# Patient Record
Sex: FEMALE | Race: WHITE | NOT HISPANIC OR LATINO | Employment: UNEMPLOYED | URBAN - METROPOLITAN AREA
[De-identification: names, ages, dates, MRNs, and addresses within clinical notes are randomized per-mention and may not be internally consistent; named-entity substitution may affect disease eponyms.]

---

## 2019-09-23 ENCOUNTER — APPOINTMENT (OUTPATIENT)
Dept: RADIOLOGY | Facility: MEDICAL CENTER | Age: 59
End: 2019-09-23
Attending: EMERGENCY MEDICINE
Payer: MEDICAID

## 2019-09-23 ENCOUNTER — HOSPITAL ENCOUNTER (EMERGENCY)
Facility: MEDICAL CENTER | Age: 59
End: 2019-09-23
Attending: EMERGENCY MEDICINE
Payer: MEDICAID

## 2019-09-23 VITALS
OXYGEN SATURATION: 100 % | WEIGHT: 195.99 LBS | DIASTOLIC BLOOD PRESSURE: 62 MMHG | RESPIRATION RATE: 16 BRPM | HEART RATE: 90 BPM | SYSTOLIC BLOOD PRESSURE: 102 MMHG | HEIGHT: 68 IN | TEMPERATURE: 98 F | BODY MASS INDEX: 29.7 KG/M2

## 2019-09-23 DIAGNOSIS — S92.354A NONDISPLACED FRACTURE OF FIFTH METATARSAL BONE, RIGHT FOOT, INITIAL ENCOUNTER FOR CLOSED FRACTURE: ICD-10-CM

## 2019-09-23 PROCEDURE — 99284 EMERGENCY DEPT VISIT MOD MDM: CPT

## 2019-09-23 PROCEDURE — 302874 HCHG BANDAGE ACE 2 OR 3""

## 2019-09-23 PROCEDURE — 29515 APPLICATION SHORT LEG SPLINT: CPT

## 2019-09-23 PROCEDURE — 73630 X-RAY EXAM OF FOOT: CPT | Mod: RT

## 2019-09-23 NOTE — ED TRIAGE NOTES
"Zuleika Carranzamindi 58 y.o. female to triage via w/c for     Chief Complaint   Patient presents with   • Foot Pain     pt reports she was walking her dog on Saturday night when she \"was on a phone call, turned around and went down\".  Pt has been trying to walk on it since but is having difficulty bearing weight due to pain.  Pt reports swelling to top of foot, lateral side.   • T-5000   • Foot Swelling     Pt is able to stand independently and transfer independently.   BP (!) 97/68   Pulse (!) 104   Temp 36.7 °C (98 °F) (Oral)   Resp 16   Ht 1.727 m (5' 8\")   Wt 88.9 kg (195 lb 15.8 oz)   LMP 01/01/2004   SpO2 100%   BMI 29.80 kg/m²   Pt returned to the lobby to await bed assignment.  Advised to return to the triage desk for any changes/concerns.  "

## 2019-09-23 NOTE — ED PROVIDER NOTES
"ED Provider Note      CHIEF COMPLAINT   Chief Complaint   Patient presents with   • Foot Pain     pt reports she was walking her dog on Saturday night when she \"was on a phone call, turned around and went down\".  Pt has been trying to walk on it since but is having difficulty bearing weight due to pain.  Pt reports swelling to top of foot, lateral side.   • T-5000   • Foot Swelling       HPI   Zuleika Evans is a 58 y.o. female who presents with right foot pain.  Walking her dog 3 days ago.  Turned around and twisted her right foot.  Throbbing nonradiating pain diffusely over the dorsum of the foot.  Denies ankle pain.  She has noted swelling.  The pain is worse with any movement and ambulation.  Does not seem to be getting better.  No numbness tingling weakness.  No laceration    REVIEW OF SYSTEMS   Pertinent negative: As above    PAST MEDICAL HISTORY   Past Medical History:   Diagnosis Date   • Acute MI    • Anxiety    • Arthritis    • Asthma    • Asthma 5/11/2010   • Bartholin's cyst     r vag wall   • CAD (coronary artery disease)    • Depression    • Disease, gall bladder     removed   • Indigestion    • Kidney stones    • Pneumonia        SOCIAL HISTORY  Social History     Tobacco Use   • Smoking status: Current Every Day Smoker     Types: Cigarettes   • Tobacco comment: 2 packs per week - down to 5 cigarettes/day   Substance Use Topics   • Alcohol use: Yes     Comment: rare   • Drug use: Yes     Comment: states she smokes marijuana occasionally       ALLERGIES   See chart    PHYSICAL EXAM  VITAL SIGNS: BP (!) 97/68   Pulse (!) 104   Temp 36.7 °C (98 °F) (Oral)   Resp 16   Ht 1.727 m (5' 8\")   Wt 88.9 kg (195 lb 15.8 oz)   LMP 01/01/2004   SpO2 100%   BMI 29.80 kg/m²   Head: Atraumatic  Eyes: Eyes normal inspection  Neck: has full range of motion, normal inspection.  Constitutional: No acute distress   Cardiovascular: Normal heart rate. Pulses strong dorsalis pedis  Thorax & Lungs: No " respiratory distress  Skin: Ecchymosis dorsally laterally over the right foot.  Musculoskeletal: Ecchymosis as above.  Mild swelling over the dorsal and lateral right foot.  No tenderness over the ankle or more proximal right lower extremity.  Compartments soft.  Neurologic:  Normal sensory and motor    RADIOLOGY/PROCEDURES  DX-FOOT-COMPLETE 3+ RIGHT   Final Result      Nondisplaced fracture of the fifth metatarsal base.         Imaging is interpreted by radiologist reviewed by me    COURSE & MEDICAL DECISION MAKING  Declined pain medication.    Patient presents with foot pain.  Has tenderness over the fifth metatarsal and diffusely over the dorsal right foot.  An x-ray was obtained demonstrating fracture at the base of the fifth metatarsal.  Placed in a posterior short leg splint on crutches.  Advised rest ice elevate and Tylenol.  Refer to Dr. Payne.  I asked her to call today to set up an appointment this week.  Return the ER if she notices other area of injury or has concern.    FINAL IMPRESSION  1.  Right fifth metatarsal fracture      This dictation was created using voice recognition software. The accuracy of the dictation is limited to the abilities of the software. I expect there may be some errors of grammar and possibly content. The nursing notes were reviewed and certain aspects of this information were incorporated into this note.      Electronically signed by: Kermit Glass, 9/23/2019 4:05 PM

## 2019-09-24 NOTE — ED NOTES
Pt splinted by tech.  Pt admits readiness for discharge.  Patient given discharge instructions and verbalized understanding.  Vital signs are stable.  Pt denies any further needs.

## 2022-02-01 ENCOUNTER — OFFICE VISIT (OUTPATIENT)
Dept: URGENT CARE | Facility: CLINIC | Age: 62
End: 2022-02-01
Payer: MEDICAID

## 2022-02-01 VITALS
HEIGHT: 67 IN | WEIGHT: 216.2 LBS | BODY MASS INDEX: 33.93 KG/M2 | SYSTOLIC BLOOD PRESSURE: 130 MMHG | RESPIRATION RATE: 16 BRPM | HEART RATE: 96 BPM | TEMPERATURE: 97.1 F | OXYGEN SATURATION: 96 % | DIASTOLIC BLOOD PRESSURE: 72 MMHG

## 2022-02-01 DIAGNOSIS — F17.200 TOBACCO DEPENDENCY: ICD-10-CM

## 2022-02-01 DIAGNOSIS — I25.10 CORONARY ARTERY DISEASE INVOLVING NATIVE HEART WITHOUT ANGINA PECTORIS, UNSPECIFIED VESSEL OR LESION TYPE: ICD-10-CM

## 2022-02-01 DIAGNOSIS — L03.211 FACIAL CELLULITIS: ICD-10-CM

## 2022-02-01 PROCEDURE — 99213 OFFICE O/P EST LOW 20 MIN: CPT | Performed by: EMERGENCY MEDICINE

## 2022-02-01 RX ORDER — BACITRACIN 500 [USP'U]/G
0.25 OINTMENT OPHTHALMIC EVERY 6 HOURS
Qty: 3.5 G | Refills: 0 | Status: SHIPPED | OUTPATIENT
Start: 2022-02-01 | End: 2022-02-06

## 2022-02-01 RX ORDER — CHOLECALCIFEROL (VITAMIN D3) 1250 MCG
CAPSULE ORAL
COMMUNITY

## 2022-02-01 ASSESSMENT — ENCOUNTER SYMPTOMS
BLURRED VISION: 0
FEVER: 0
CHILLS: 0
COUGH: 0
EYE DISCHARGE: 0
DOUBLE VISION: 0
EYE PAIN: 0

## 2022-02-01 ASSESSMENT — VISUAL ACUITY
OS_CC: 20/50
OD_CC: 20/40

## 2022-02-01 NOTE — PROGRESS NOTES
"Subjective     Zuleika Evans is a 61 y.o. female who presents with Eye Problem (swollen left eye, pussed clear fuilds with some blood, comes and goes, lump between eye and nose x 7months)            Pt reports left supraorbital cyst (shows pic with golfball-sized lesion just inferior to left medial eyebrow) which burst yesterday.  No systemic sx, no rhinorrhea.  States has had same once before which also ruptured spontaneously and was not eval.  No visual changes or impairment, no eye drainage, no nasal complaints, no swollen lymph nodes noted.       Review of Systems   Constitutional: Negative for chills and fever.   Eyes: Negative for blurred vision, double vision, pain and discharge.        Lesion over left eye   Respiratory: Negative for cough.    Cardiovascular: Negative for chest pain.   Skin:        Lesion over eye as noted   All other systems reviewed and are negative.             Objective     /72 (BP Location: Left arm, Patient Position: Sitting, BP Cuff Size: Adult)   Pulse 96   Temp 36.2 °C (97.1 °F) (Temporal)   Resp 16   Ht 1.702 m (5' 7\")   Wt 98.1 kg (216 lb 3.2 oz)   LMP 01/01/2004   SpO2 96%   BMI 33.86 kg/m²      Physical Exam  Vitals and nursing note reviewed.   Constitutional:       General: She is not in acute distress.     Appearance: Normal appearance. She is not toxic-appearing.   HENT:      Head: Normocephalic and atraumatic.      Nose: Nose normal.      Mouth/Throat:      Mouth: Mucous membranes are moist.      Pharynx: No oropharyngeal exudate or posterior oropharyngeal erythema.   Eyes:      General: No scleral icterus.     Extraocular Movements: Extraocular movements intact.      Conjunctiva/sclera: Conjunctivae normal.      Right eye: Right conjunctiva is not injected. No exudate or hemorrhage.     Left eye: Left conjunctiva is not injected. No exudate or hemorrhage.     Pupils: Pupils are equal, round, and reactive to light.     Cardiovascular:      Rate and " Rhythm: Normal rate and regular rhythm.   Pulmonary:      Effort: Pulmonary effort is normal. No respiratory distress.      Breath sounds: Normal breath sounds. No wheezing.   Musculoskeletal:      Cervical back: Normal range of motion and neck supple.   Lymphadenopathy:      Cervical: No cervical adenopathy.   Skin:     General: Skin is warm and dry.      Comments: 4mm reddened area on medial left brow where one lesion burst, no streaking or undue tenderness.  Small area on left upper eyelid where another lesion apparently drained - no erythema.   Neurological:      Mental Status: She is alert and oriented to person, place, and time.   Psychiatric:         Mood and Affect: Mood normal.         Behavior: Behavior normal.         Thought Content: Thought content normal.                             Assessment & Plan   Encounter Diagnoses   Name Primary?   • Facial cellulitis    • Coronary artery disease involving native heart without angina pectoris, unspecified vessel or lesion type    • Tobacco dependency           1. Facial cellulitis  4mm Limited area of cellulitis medial brow, feel topical management will suffice, no systemic abx needed.  Given this is second episode, feel additional eval warranted in followup.   - Referral to Dermatology  - bacitracin 500 UNIT/GM Ointment; Apply 0.25 Inches to left eye every 6 hours for 5 days. Apply to red area above left eye as prescribed for 4 days, then as needed.  Dispense: 3.5 g; Refill: 0    2. Coronary artery disease involving native heart without angina pectoris, unspecified vessel or lesion type  No active complaints  - Referral to establish with [facility] PCP    3. Tobacco dependency  Counseled pt on importance of tobacco cessation for her general health.     Pt agreeable w plans as above, provided discharge instructions and verbal instructions on wound care.

## 2022-02-01 NOTE — PATIENT INSTRUCTIONS
If you are unable to get in with dermatology locally, consider trying nearby cities such as [facility], [place] as well if you are able to travel.   Apply bacitracin ointment to reddened area above left eye twice daily.  Return if redness spreads, pain in eye, worse.

## 2022-02-18 PROCEDURE — RXMED WILLOW AMBULATORY MEDICATION CHARGE: Performed by: EMERGENCY MEDICINE

## 2022-02-24 ENCOUNTER — PHARMACY VISIT (OUTPATIENT)
Dept: PHARMACY | Facility: MEDICAL CENTER | Age: 62
End: 2022-02-24
Payer: MEDICARE

## 2022-02-24 PROCEDURE — RXMED WILLOW AMBULATORY MEDICATION CHARGE: Performed by: HOSPITALIST

## 2022-02-24 RX ORDER — BISACODYL 10 MG
SUPPOSITORY, RECTAL RECTAL
Qty: 10 SUPPOSITORY | Refills: 0 | OUTPATIENT
Start: 2022-02-24

## 2022-02-24 RX ORDER — AMOXICILLIN 250 MG
1 CAPSULE ORAL 2 TIMES DAILY
Qty: 60 TABLET | Refills: 0 | OUTPATIENT
Start: 2022-02-24

## 2022-02-24 RX ORDER — IPRATROPIUM BROMIDE AND ALBUTEROL 20; 100 UG/1; UG/1
SPRAY, METERED RESPIRATORY (INHALATION)
Qty: 4 G | Refills: 0 | OUTPATIENT
Start: 2022-02-24

## 2022-02-24 RX ORDER — ATORVASTATIN CALCIUM 10 MG/1
TABLET, FILM COATED ORAL
Qty: 30 TABLET | Refills: 0 | OUTPATIENT
Start: 2022-02-24

## 2022-02-24 RX ORDER — ASPIRIN 81 MG/1
TABLET, CHEWABLE ORAL
Qty: 60 TABLET | Refills: 0 | OUTPATIENT
Start: 2022-02-24

## 2022-02-25 ENCOUNTER — PHARMACY VISIT (OUTPATIENT)
Dept: PHARMACY | Facility: MEDICAL CENTER | Age: 62
End: 2022-02-25
Payer: MEDICARE

## 2022-03-09 PROCEDURE — RXMED WILLOW AMBULATORY MEDICATION CHARGE: Performed by: ORTHOPAEDIC SURGERY

## 2022-03-10 ENCOUNTER — PHARMACY VISIT (OUTPATIENT)
Dept: PHARMACY | Facility: MEDICAL CENTER | Age: 62
End: 2022-03-10
Payer: MEDICARE

## 2023-11-17 ENCOUNTER — OFFICE VISIT (OUTPATIENT)
Dept: URGENT CARE | Facility: CLINIC | Age: 63
End: 2023-11-17
Payer: MEDICAID

## 2023-11-17 VITALS
BODY MASS INDEX: 31.07 KG/M2 | TEMPERATURE: 96.6 F | OXYGEN SATURATION: 98 % | DIASTOLIC BLOOD PRESSURE: 68 MMHG | WEIGHT: 205 LBS | HEIGHT: 68 IN | RESPIRATION RATE: 14 BRPM | SYSTOLIC BLOOD PRESSURE: 108 MMHG | HEART RATE: 72 BPM

## 2023-11-17 DIAGNOSIS — J06.9 UPPER RESPIRATORY TRACT INFECTION, UNSPECIFIED TYPE: ICD-10-CM

## 2023-11-17 PROCEDURE — 3074F SYST BP LT 130 MM HG: CPT | Performed by: PHYSICIAN ASSISTANT

## 2023-11-17 PROCEDURE — 3078F DIAST BP <80 MM HG: CPT | Performed by: PHYSICIAN ASSISTANT

## 2023-11-17 PROCEDURE — 99213 OFFICE O/P EST LOW 20 MIN: CPT | Performed by: PHYSICIAN ASSISTANT

## 2023-11-17 RX ORDER — DEXTROMETHORPHAN HYDROBROMIDE AND PROMETHAZINE HYDROCHLORIDE 15; 6.25 MG/5ML; MG/5ML
5 SYRUP ORAL EVERY 4 HOURS PRN
Qty: 120 ML | Refills: 0 | Status: SHIPPED | OUTPATIENT
Start: 2023-11-17

## 2023-11-17 RX ORDER — LIDOCAINE HYDROCHLORIDE 20 MG/ML
5 SOLUTION OROPHARYNGEAL
Qty: 100 ML | Refills: 0 | Status: SHIPPED | OUTPATIENT
Start: 2023-11-17 | End: 2023-11-22

## 2023-11-17 ASSESSMENT — ENCOUNTER SYMPTOMS
HEADACHES: 0
SPUTUM PRODUCTION: 0
NAUSEA: 0
DIAPHORESIS: 0
ABDOMINAL PAIN: 0
SHORTNESS OF BREATH: 0
SINUS PAIN: 0
FEVER: 0
DIZZINESS: 0
VOMITING: 0
SORE THROAT: 1
MYALGIAS: 0
PALPITATIONS: 0
WHEEZING: 0
COUGH: 1
DIARRHEA: 1
CHILLS: 0

## 2023-11-17 ASSESSMENT — FIBROSIS 4 INDEX: FIB4 SCORE: 0.85

## 2023-11-18 NOTE — PROGRESS NOTES
Subjective:     CHIEF COMPLAINT  Chief Complaint   Patient presents with    Pharyngitis     X 5 days, sore throat, diarrhea, cough, runny nose       HPI  Zuleika Evans is a very pleasant 62 y.o. female who presents to the clinic with URI-like symptoms x5 days.  Patient has been experiencing sore throat, cough, congestion and diarrhea.  Main complaint at this time is pain with swallowing.  No difficulty swallowing or handling secretions.  Does not believe she has been running a fever.  No neck pain or stiffness.  Cough is dry nonproductive.  No shortness of breath or chest pain.  No known ill contacts.  She has been using Chloraseptic spray which provides moderate improvement.  Started taking DayQuil today.    REVIEW OF SYSTEMS  Review of Systems   Constitutional:  Positive for malaise/fatigue. Negative for chills, diaphoresis and fever.   HENT:  Positive for congestion and sore throat. Negative for ear pain and sinus pain.    Respiratory:  Positive for cough. Negative for sputum production, shortness of breath and wheezing.    Cardiovascular:  Negative for chest pain and palpitations.   Gastrointestinal:  Positive for diarrhea. Negative for abdominal pain, nausea and vomiting.   Musculoskeletal:  Negative for myalgias.   Neurological:  Negative for dizziness and headaches.       PAST MEDICAL HISTORY  Patient Active Problem List    Diagnosis Date Noted    Ankle pain, right 06/11/2012    Neck pain 04/18/2012    Radicular pain in left arm 04/18/2012    Right arm numbness 04/18/2012    Dyslipidemia 02/08/2012    Blood in stool 02/08/2012    Pelvic pain in female 11/22/2011    Vitamin d deficiency 10/18/2011    Bartholin cyst 09/14/2011    GERD (gastroesophageal reflux disease) 06/16/2011    Exposure to hepatitis 03/02/2011    Headache 08/24/2010    Syncope 08/24/2010    Bruising 08/24/2010    CRI (chronic renal insufficiency) 06/08/2010    Facial injury 06/08/2010    CAD (coronary artery disease) 05/11/2010     ASTHMA 05/11/2010    Arthritis 05/11/2010    Anemia 05/11/2010    Kidney calculi 05/11/2010       SURGICAL HISTORY   has a past surgical history that includes cholecystectomy.    ALLERGIES  Allergies   Allergen Reactions    Other Food      EGGS    Other Misc      BEES    Penicillins Shortness of Breath     Pt states that penicillins make her throat close up    Prilosec [Omeprazole] Vomiting    Sulfa Drugs Shortness of Breath and Swelling       CURRENT MEDICATIONS  Home Medications       Reviewed by Rene Amaro P.A.-C. (Physician Assistant) on 11/17/23 at 1659  Med List Status: <None>     Medication Last Dose Status   Albuterol (PROVENTIL INH) Not Taking Active   ASPIRIN Not Taking Active   aspirin (ASA) 81 MG Chew Tab chewable tablet Not Taking Active   atorvastatin (LIPITOR) 10 MG Tab Not Taking Active   bacitracin 500 UNIT/GM Ointment Not Taking Active   bisacodyl (DULCOLAX) 10 MG Suppos Not Taking Active   Cholecalciferol (VITAMIN D3) 1.25 MG (15523 UT) Cap Not Taking Active   clindamycin (CLEOCIN) 150 MG CAPS Not Taking Active   clonazepam (KLONOPIN) 0.5 MG TABS Not Taking Active   ferrous sulfate 325 (65 FE) MG tablet Not Taking Active   ipratropium-albuterol (COMBIVENT RESPIMAT)  MCG/ACT Aero Soln Not Taking Active   mirtazapine (REMERON) 15 MG TABS Not Taking Active   neomycin-polymyxin-HC (CORTISPORIN) 3.5-73929-9 SUSP Not Taking Active   nitroglycerin (NITROSTAT) 0.4 MG SUBL Not Taking Active   oxycodone, immediate release, (ROXICODONE) 5 MG TABS Not Taking Active   oxycodone-acetaminophen (PERCOCET) 5-325 MG TABS Not Taking Active   senna-docusate (PERICOLACE OR SENOKOT S) 8.6-50 MG Tab Not Taking Active   vitamin D, Ergocalciferol, (DRISDOL) 68654 UNIT CAPS capsule Not Taking Active   ZINC-VITAMIN C PO Not Taking Active                    SOCIAL HISTORY  Social History     Tobacco Use    Smoking status: Every Day     Types: Cigarettes    Smokeless tobacco: Never    Tobacco comments:     2  "packs per week - down to 5 cigarettes/day   Vaping Use    Vaping Use: Never used   Substance and Sexual Activity    Alcohol use: Not Currently     Comment: rare    Drug use: Yes     Comment: states she smokes marijuana occasionally    Sexual activity: Not on file       FAMILY HISTORY  History reviewed. No pertinent family history.       Objective:     VITAL SIGNS: /68   Pulse 72   Temp 35.9 °C (96.6 °F) (Temporal)   Resp 14   Ht 1.727 m (5' 8\")   Wt 93 kg (205 lb)   LMP 01/01/2004   SpO2 98%   BMI 31.17 kg/m²     PHYSICAL EXAM  Physical Exam  Constitutional:       General: She is not in acute distress.     Appearance: Normal appearance. She is not ill-appearing, toxic-appearing or diaphoretic.   HENT:      Head: Normocephalic and atraumatic.      Right Ear: Tympanic membrane, ear canal and external ear normal.      Left Ear: Tympanic membrane, ear canal and external ear normal.      Nose: Congestion present. No rhinorrhea.      Mouth/Throat:      Mouth: Mucous membranes are moist.      Pharynx: No oropharyngeal exudate or posterior oropharyngeal erythema.   Eyes:      Conjunctiva/sclera: Conjunctivae normal.   Cardiovascular:      Rate and Rhythm: Normal rate and regular rhythm.      Pulses: Normal pulses.      Heart sounds: Normal heart sounds.   Pulmonary:      Effort: Pulmonary effort is normal.      Breath sounds: Normal breath sounds. No wheezing, rhonchi or rales.   Abdominal:      Palpations: Abdomen is soft.      Tenderness: There is no abdominal tenderness.   Musculoskeletal:      Cervical back: Normal range of motion. No muscular tenderness.   Lymphadenopathy:      Cervical: No cervical adenopathy.   Skin:     General: Skin is warm and dry.      Capillary Refill: Capillary refill takes less than 2 seconds.   Neurological:      Mental Status: She is alert.   Psychiatric:         Mood and Affect: Mood normal.         Thought Content: Thought content normal.         Assessment/Plan:     1. Upper " respiratory tract infection, unspecified type  - lidocaine (XYLOCAINE) 2 % Solution; Take 5 mL by mouth every 3 hours as needed for Throat/Mouth Pain for up to 5 days.  Dispense: 100 mL; Refill: 0  - promethazine-dextromethorphan (PROMETHAZINE-DM) 6.25-15 MG/5ML syrup; Take 5 mL by mouth every four hours as needed for Cough.  Dispense: 120 mL; Refill: 0      MDM/Comments:    -Discussed viral etiology of URI.     Symptomatic care.  -Oral hydration and rest.   -Over the counter expectorant as directed; Guaifenesin (Mucinex).  -Diphenhydramine as directed for rhinorrhea (runny nose) and sneezing.  --May take over the counter pseudoephedrine for nasal congestion. Can increase your blood pressure.  -Tylenol or ibuprofen for pain and fever as directed.   -Saline nasal spray as a decongestant.    Follow up for shortness of breath, fevers, elevated heart rate, weakness, prolonged cough, chest pain, or any other concerns.    Differential diagnosis, natural history, supportive care, and indications for immediate follow-up discussed. All questions answered. Patient agrees with the plan of care.    Follow-up as needed if symptoms worsen or fail to improve to PCP, Urgent care or Emergency Room.    I have personally reviewed prior external notes and test results pertinent to today's visit.  I have independently reviewed and interpreted all diagnostics ordered during this urgent care acute visit.   Discussed management options (risks,benefits, and alternatives to treatment). Pt expresses understanding and the treatment plan was agreed upon. Questions were encouraged and answered to pt's satisfaction.    Please note that this dictation was created using voice recognition software. I have made a reasonable attempt to correct obvious errors, but I expect that there are errors of grammar and possibly content that I did not discover before finalizing the note.

## 2025-05-27 ENCOUNTER — PHARMACY VISIT (OUTPATIENT)
Dept: PHARMACY | Facility: MEDICAL CENTER | Age: 65
End: 2025-05-27

## 2025-05-27 ENCOUNTER — PHARMACY VISIT (OUTPATIENT)
Dept: PHARMACY | Facility: MEDICAL CENTER | Age: 65
End: 2025-05-27
Payer: COMMERCIAL

## 2025-05-27 ENCOUNTER — APPOINTMENT (OUTPATIENT)
Dept: RADIOLOGY | Facility: IMAGING CENTER | Age: 65
End: 2025-05-27
Attending: PHYSICIAN ASSISTANT
Payer: MEDICAID

## 2025-05-27 ENCOUNTER — OFFICE VISIT (OUTPATIENT)
Dept: URGENT CARE | Facility: CLINIC | Age: 65
End: 2025-05-27
Payer: MEDICAID

## 2025-05-27 VITALS
OXYGEN SATURATION: 99 % | HEART RATE: 98 BPM | WEIGHT: 196 LBS | HEIGHT: 68 IN | DIASTOLIC BLOOD PRESSURE: 78 MMHG | BODY MASS INDEX: 29.7 KG/M2 | RESPIRATION RATE: 13 BRPM | TEMPERATURE: 96.7 F | SYSTOLIC BLOOD PRESSURE: 100 MMHG

## 2025-05-27 DIAGNOSIS — R19.7 DIARRHEA, UNSPECIFIED TYPE: ICD-10-CM

## 2025-05-27 DIAGNOSIS — N30.90 CYSTITIS: ICD-10-CM

## 2025-05-27 DIAGNOSIS — R05.1 ACUTE COUGH: ICD-10-CM

## 2025-05-27 DIAGNOSIS — S61.219A CUT OF FINGER: ICD-10-CM

## 2025-05-27 DIAGNOSIS — R35.0 FREQUENCY OF URINATION: ICD-10-CM

## 2025-05-27 LAB
APPEARANCE UR: CLEAR
BILIRUB UR STRIP-MCNC: NORMAL MG/DL
COLOR UR AUTO: YELLOW
GLUCOSE UR STRIP.AUTO-MCNC: NEGATIVE MG/DL
KETONES UR STRIP.AUTO-MCNC: 15 MG/DL
LEUKOCYTE ESTERASE UR QL STRIP.AUTO: NORMAL
NITRITE UR QL STRIP.AUTO: NEGATIVE
PH UR STRIP.AUTO: 5.5 [PH] (ref 5–8)
PROT UR QL STRIP: >=300 MG/DL
RBC UR QL AUTO: NORMAL
SP GR UR STRIP.AUTO: >=1.03
UROBILINOGEN UR STRIP-MCNC: 1 MG/DL

## 2025-05-27 PROCEDURE — RXMED WILLOW AMBULATORY MEDICATION CHARGE: Performed by: PHYSICIAN ASSISTANT

## 2025-05-27 PROCEDURE — 3078F DIAST BP <80 MM HG: CPT | Performed by: PHYSICIAN ASSISTANT

## 2025-05-27 PROCEDURE — 90471 IMMUNIZATION ADMIN: CPT | Performed by: PHYSICIAN ASSISTANT

## 2025-05-27 PROCEDURE — 99214 OFFICE O/P EST MOD 30 MIN: CPT | Mod: 25 | Performed by: PHYSICIAN ASSISTANT

## 2025-05-27 PROCEDURE — 90715 TDAP VACCINE 7 YRS/> IM: CPT | Performed by: PHYSICIAN ASSISTANT

## 2025-05-27 PROCEDURE — 3074F SYST BP LT 130 MM HG: CPT | Performed by: PHYSICIAN ASSISTANT

## 2025-05-27 PROCEDURE — 71046 X-RAY EXAM CHEST 2 VIEWS: CPT | Mod: TC | Performed by: RADIOLOGY

## 2025-05-27 PROCEDURE — 81002 URINALYSIS NONAUTO W/O SCOPE: CPT | Performed by: PHYSICIAN ASSISTANT

## 2025-05-27 RX ORDER — DOXYCYCLINE HYCLATE 100 MG
100 TABLET ORAL 2 TIMES DAILY
Qty: 14 TABLET | Refills: 0 | Status: SHIPPED | OUTPATIENT
Start: 2025-05-27

## 2025-05-27 RX ORDER — CEFDINIR 300 MG/1
300 CAPSULE ORAL 2 TIMES DAILY
Qty: 14 CAPSULE | Refills: 0 | Status: SHIPPED | OUTPATIENT
Start: 2025-05-27 | End: 2025-06-03

## 2025-05-27 ASSESSMENT — ENCOUNTER SYMPTOMS
CARDIOVASCULAR NEGATIVE: 1
VOMITING: 0
WHEEZING: 0
SORE THROAT: 0
DIZZINESS: 0
CHILLS: 0
BLOATING: 0
FEVER: 0
HEADACHES: 1
MYALGIAS: 0
DIARRHEA: 1
WEIGHT LOSS: 1
COUGH: 1
NAUSEA: 1
ABDOMINAL PAIN: 0
SWEATS: 0
ARTHRALGIAS: 0
SHORTNESS OF BREATH: 0

## 2025-05-27 NOTE — PROGRESS NOTES
Subjective     Zuleika Evans is a 64 y.o. female who presents with Diarrhea, Weight Loss, Headache, Cold Sores, Nausea, and Difficulty Breathing (X 2 months )            Multiple concerns today:    Main concern is diarrhea for the past 4 weeks.  Mainly watery.  There is some blood when wiping.  She is having some dysuria and urinary frequency.  She denies abdominal pain or constipation.  She did have a history of colon cancer in 2016 with subsequent bowel resection.  She has had no medical follow-up since that time.    Diarrhea   This is a new problem. The current episode started more than 1 month ago. The problem occurs 5 to 10 times per day. The problem has been unchanged. The stool consistency is described as Watery. Associated symptoms include coughing, headaches, a URI and weight loss. Pertinent negatives include no abdominal pain, arthralgias, bloating, chills, fever, myalgias, sweats or vomiting. There are no known risk factors. She has tried nothing for the symptoms. The treatment provided no relief. Her past medical history is significant for bowel resection. Colon CA 2016       Patient also with cough congestion and fatigue for the past month.  She is a smoker and lives in a mobile home which is [place] and potentially moldy.  No fever, chest pain, leg swelling or calf tenderness    Patient also has a superficial cut to her finger via metal and needs a tetanus booster      PMH:  has a past medical history of Acute MI (HCC), Anxiety, Arthritis, ASTHMA, ASTHMA (5/11/2010), Bartholin's cyst, CAD (coronary artery disease), Depression, Disease, gall bladder, Indigestion, Kidney stones, and Pneumonia.    She has no past medical history of Angina, Arrhythmia, Backpain, Bronchitis, Cancer (HCC), CATARACT, Congestive heart failure (HCC), COPD, Diabetes, Dialysis, Glaucoma, Heart murmur, Heart valve disease, Hypertension, Infectious disease, Jaundice, Pacemaker, Personal history of venous thrombosis and  "embolism, Rheumatic fever, Seizure (HCC), Stroke (HCC), Unspecified disorder of thyroid, or Unspecified urinary incontinence.  MEDS: Current Medications[1]  ALLERGIES: Allergies[2]  SURGHX: Past Surgical History[3]  SOCHX:  reports that she has been smoking cigarettes. She has never used smokeless tobacco. She reports that she does not currently use alcohol. She reports current drug use.  FH: family history is not on file.      Review of Systems   Constitutional:  Positive for malaise/fatigue and weight loss. Negative for chills and fever.   HENT:  Positive for congestion. Negative for ear pain and sore throat.         Cold sore   Respiratory:  Positive for cough. Negative for shortness of breath and wheezing.    Cardiovascular: Negative.    Gastrointestinal:  Positive for diarrhea and nausea. Negative for abdominal pain, bloating and vomiting.   Genitourinary: Negative.    Musculoskeletal:  Negative for arthralgias, joint pain and myalgias.   Neurological:  Positive for headaches. Negative for dizziness.       Medications, Allergies, and current problem list reviewed today in Epic             Objective     /78   Pulse 98   Temp 35.9 °C (96.7 °F) (Temporal)   Resp 13   Ht 1.727 m (5' 8\")   Wt 88.9 kg (196 lb)   LMP 01/01/2004   SpO2 99%   BMI 29.80 kg/m²      Physical Exam  Vitals and nursing note reviewed.   Constitutional:       General: She is not in acute distress.     Appearance: Normal appearance. She is well-developed. She is not ill-appearing, toxic-appearing or diaphoretic.   HENT:      Head: Normocephalic and atraumatic.      Right Ear: Tympanic membrane, ear canal and external ear normal.      Left Ear: Tympanic membrane, ear canal and external ear normal.      Nose: Congestion and rhinorrhea present.      Mouth/Throat:      Mouth: Mucous membranes are moist.      Pharynx: No oropharyngeal exudate or posterior oropharyngeal erythema.   Eyes:      General:         Right eye: No discharge.    "      Left eye: No discharge.      Conjunctiva/sclera: Conjunctivae normal.   Cardiovascular:      Rate and Rhythm: Normal rate and regular rhythm.      Pulses: Normal pulses.      Heart sounds: Normal heart sounds.   Pulmonary:      Effort: Pulmonary effort is normal. No respiratory distress.      Breath sounds: Normal breath sounds. No stridor. No wheezing, rhonchi or rales.   Abdominal:      General: Abdomen is flat. There is no distension.      Palpations: Abdomen is soft.      Tenderness: There is no abdominal tenderness. There is no right CVA tenderness, left CVA tenderness, guarding or rebound.      Comments: No suprapubic tenderness   Musculoskeletal:      Cervical back: Normal range of motion and neck supple.      Right lower leg: No edema.      Left lower leg: No edema.   Lymphadenopathy:      Cervical: No cervical adenopathy.   Skin:     General: Skin is warm and dry.      Findings: No rash.      Comments: Superficial cut to finger without signs of infection   Neurological:      General: No focal deficit present.      Mental Status: She is alert and oriented to person, place, and time. Mental status is at baseline.   Psychiatric:         Mood and Affect: Mood normal.         Behavior: Behavior normal.         Thought Content: Thought content normal.         Judgment: Judgment normal.                             5/27/2025 3:42 PM     HISTORY/REASON FOR EXAM:  Cough.  Shortness of breath.     TECHNIQUE/EXAM DESCRIPTION AND NUMBER OF VIEWS:  Two views of the chest.     COMPARISON:  2/14/2012     FINDINGS:  Cardiomediastinal contour is within normal limits.  No focal pulmonary consolidation.  No pleural fluid collection or pneumothorax.  Degenerative change of thoracic spine.     IMPRESSION:     No acute cardiopulmonary disease.     Assessment & Plan  Acute cough  Thankfully, chest x-ray negative.  PO2 99%.  Lungs are clear without wheezing rhonchi rales or stridor.  No lower leg swelling calf tenderness or  JVD.  Likely environmental versus tobacco versus viral.  Doxycycline prescribed for UTI should conceivably cover bacterial etiology.    Orders:    DX-CHEST-2 VIEWS; Future    CBC WITH DIFFERENTIAL; Future    Comp Metabolic Panel; Future    CULTURE STOOL; Future    Referral to establish with PCP    cefdinir (OMNICEF) 300 MG Cap; Take 1 Capsule by mouth 2 times a day for 7 days.    doxycycline (VIBRAMYCIN) 100 MG Tab; Take 1 Tablet by mouth 2 times a day.    Frequency of urination    Orders:    POCT Urinalysis    Diarrhea, unspecified type  Watery diarrhea for 3 to 4 weeks.  Associated dysuria and frequency.  No fever, bloody stools or sharp abdominal pain.  History of colon cancer in 2016, no follow-up care since.  Eating and drinking well.  No weight loss, sweats or fever.  Vital stable.  Abdominal exam benign.  Urinalysis with cloudy urine, positive blood and leukocytes.  Should be treated for acute UTI.  Lab work and stool studies have been initiated.  I was adamant that patient needs to establish with PCP for routine medical care and follow-up of her colon cancer from 2016.    Orders:    CBC WITH DIFFERENTIAL; Future    Comp Metabolic Panel; Future    CULTURE STOOL; Future    Referral to establish with PCP    Cystitis    Orders:    cefdinir (OMNICEF) 300 MG Cap; Take 1 Capsule by mouth 2 times a day for 7 days.    doxycycline (VIBRAMYCIN) 100 MG Tab; Take 1 Tablet by mouth 2 times a day.    Cut of finger    Orders:    Tdap =>8yo IM             I personally reviewed prior external notes and test results pertinent to today's visit. Return to clinic or go to ED if symptoms worsen or persist. Red flag symptoms and indications for ED discussed at length. Patient/Parent/Guardian voices understanding.  AVS with post-visit instructions printed and provided or given verbally.  Follow-up with your primary care provider in 3-5 days. All side effects and potential interactions of prescribed medication discussed including  allergic response, GI upset, tendon injury, rash, sedation, OCP effectiveness, etc.    Please note that this dictation was created using voice recognition software. I have made every reasonable attempt to correct obvious errors, but I expect that there are errors of grammar and possibly content that I did not discover before finalizing the note.           [1]   Current Outpatient Medications:     cefdinir (OMNICEF) 300 MG Cap, Take 1 Capsule by mouth 2 times a day for 7 days., Disp: 14 Capsule, Rfl: 0    doxycycline (VIBRAMYCIN) 100 MG Tab, Take 1 Tablet by mouth 2 times a day., Disp: 14 Tablet, Rfl: 0  [2]   Allergies  Allergen Reactions    Other Food      EGGS    Other Misc      BEES    Penicillins Shortness of Breath     Pt states that penicillins make her throat close up    Prilosec [Omeprazole] Vomiting    Sulfa Drugs Shortness of Breath and Swelling   [3]   Past Surgical History:  Procedure Laterality Date    CHOLECYSTECTOMY

## 2025-06-06 ENCOUNTER — OFFICE VISIT (OUTPATIENT)
Dept: MEDICAL GROUP | Facility: CLINIC | Age: 65
End: 2025-06-06
Payer: MEDICAID

## 2025-06-06 ENCOUNTER — HOSPITAL ENCOUNTER (OUTPATIENT)
Facility: MEDICAL CENTER | Age: 65
End: 2025-06-06
Attending: STUDENT IN AN ORGANIZED HEALTH CARE EDUCATION/TRAINING PROGRAM
Payer: MEDICAID

## 2025-06-06 VITALS
SYSTOLIC BLOOD PRESSURE: 114 MMHG | OXYGEN SATURATION: 97 % | HEART RATE: 73 BPM | BODY MASS INDEX: 30.3 KG/M2 | RESPIRATION RATE: 12 BRPM | DIASTOLIC BLOOD PRESSURE: 66 MMHG | WEIGHT: 199.9 LBS | HEIGHT: 68 IN | TEMPERATURE: 97.1 F

## 2025-06-06 DIAGNOSIS — R19.7 DIARRHEA, UNSPECIFIED TYPE: ICD-10-CM

## 2025-06-06 DIAGNOSIS — R53.83 OTHER FATIGUE: ICD-10-CM

## 2025-06-06 DIAGNOSIS — R80.9 PROTEINURIA, UNSPECIFIED TYPE: ICD-10-CM

## 2025-06-06 DIAGNOSIS — R53.83 OTHER FATIGUE: Primary | ICD-10-CM

## 2025-06-06 DIAGNOSIS — Z85.038 HISTORY OF COLON CANCER: ICD-10-CM

## 2025-06-06 LAB
ALBUMIN SERPL BCP-MCNC: 4.2 G/DL (ref 3.2–4.9)
ALBUMIN/GLOB SERPL: 1.3 G/DL
ALP SERPL-CCNC: 91 U/L (ref 30–99)
ALT SERPL-CCNC: 19 U/L (ref 2–50)
ANION GAP SERPL CALC-SCNC: 12 MMOL/L (ref 7–16)
APPEARANCE UR: CLEAR
AST SERPL-CCNC: 26 U/L (ref 12–45)
BACTERIA #/AREA URNS HPF: ABNORMAL /HPF
BASOPHILS # BLD AUTO: 1.3 % (ref 0–1.8)
BASOPHILS # BLD: 0.11 K/UL (ref 0–0.12)
BILIRUB SERPL-MCNC: 0.4 MG/DL (ref 0.1–1.5)
BILIRUB UR QL STRIP.AUTO: NEGATIVE
BUN SERPL-MCNC: 35 MG/DL (ref 8–22)
CALCIUM ALBUM COR SERPL-MCNC: 9.6 MG/DL (ref 8.5–10.5)
CALCIUM SERPL-MCNC: 9.8 MG/DL (ref 8.5–10.5)
CASTS URNS QL MICRO: ABNORMAL /LPF (ref 0–2)
CHLORIDE SERPL-SCNC: 108 MMOL/L (ref 96–112)
CHOLEST SERPL-MCNC: 178 MG/DL (ref 100–199)
CO2 SERPL-SCNC: 19 MMOL/L (ref 20–33)
COLOR UR: YELLOW
CREAT SERPL-MCNC: 1.34 MG/DL (ref 0.5–1.4)
CRP SERPL HS-MCNC: <0.3 MG/DL (ref 0–0.75)
EOSINOPHIL # BLD AUTO: 0.35 K/UL (ref 0–0.51)
EOSINOPHIL NFR BLD: 4.2 % (ref 0–6.9)
EPITHELIAL CELLS 1715: ABNORMAL /HPF (ref 0–5)
ERYTHROCYTE [DISTWIDTH] IN BLOOD BY AUTOMATED COUNT: 47.8 FL (ref 35.9–50)
EST. AVERAGE GLUCOSE BLD GHB EST-MCNC: 117 MG/DL
GFR SERPLBLD CREATININE-BSD FMLA CKD-EPI: 44 ML/MIN/1.73 M 2
GLOBULIN SER CALC-MCNC: 3.3 G/DL (ref 1.9–3.5)
GLUCOSE SERPL-MCNC: 94 MG/DL (ref 65–99)
GLUCOSE UR STRIP.AUTO-MCNC: NEGATIVE MG/DL
HBA1C MFR BLD: 5.7 % (ref 4–5.6)
HCT VFR BLD AUTO: 41.7 % (ref 37–47)
HDLC SERPL-MCNC: 49 MG/DL
HGB BLD-MCNC: 13.1 G/DL (ref 12–16)
IMM GRANULOCYTES # BLD AUTO: 0.02 K/UL (ref 0–0.11)
IMM GRANULOCYTES NFR BLD AUTO: 0.2 % (ref 0–0.9)
KETONES UR STRIP.AUTO-MCNC: NEGATIVE MG/DL
LDLC SERPL CALC-MCNC: 106 MG/DL
LEUKOCYTE ESTERASE UR QL STRIP.AUTO: ABNORMAL
LYMPHOCYTES # BLD AUTO: 3.7 K/UL (ref 1–4.8)
LYMPHOCYTES NFR BLD: 44.2 % (ref 22–41)
MCH RBC QN AUTO: 28.7 PG (ref 27–33)
MCHC RBC AUTO-ENTMCNC: 31.4 G/DL (ref 32.2–35.5)
MCV RBC AUTO: 91.4 FL (ref 81.4–97.8)
MICRO URNS: ABNORMAL
MONOCYTES # BLD AUTO: 0.55 K/UL (ref 0–0.85)
MONOCYTES NFR BLD AUTO: 6.6 % (ref 0–13.4)
NEUTROPHILS # BLD AUTO: 3.64 K/UL (ref 1.82–7.42)
NEUTROPHILS NFR BLD: 43.5 % (ref 44–72)
NITRITE UR QL STRIP.AUTO: NEGATIVE
NRBC # BLD AUTO: 0 K/UL
NRBC BLD-RTO: 0 /100 WBC (ref 0–0.2)
PH UR STRIP.AUTO: 5.5 [PH] (ref 5–8)
PLATELET # BLD AUTO: 317 K/UL (ref 164–446)
PMV BLD AUTO: 11 FL (ref 9–12.9)
POTASSIUM SERPL-SCNC: 5.1 MMOL/L (ref 3.6–5.5)
PROT SERPL-MCNC: 7.5 G/DL (ref 6–8.2)
PROT UR QL STRIP: NEGATIVE MG/DL
RBC # BLD AUTO: 4.56 M/UL (ref 4.2–5.4)
RBC # URNS HPF: ABNORMAL /HPF (ref 0–2)
RBC UR QL AUTO: NEGATIVE
SODIUM SERPL-SCNC: 139 MMOL/L (ref 135–145)
SP GR UR STRIP.AUTO: 1.02
TRIGL SERPL-MCNC: 116 MG/DL (ref 0–149)
TSH SERPL DL<=0.005 MIU/L-ACNC: 3.21 UIU/ML (ref 0.38–5.33)
UROBILINOGEN UR STRIP.AUTO-MCNC: 0.2 EU/DL
WBC # BLD AUTO: 8.4 K/UL (ref 4.8–10.8)
WBC #/AREA URNS HPF: ABNORMAL /HPF

## 2025-06-06 PROCEDURE — 80061 LIPID PANEL: CPT

## 2025-06-06 PROCEDURE — 85025 COMPLETE CBC W/AUTO DIFF WBC: CPT

## 2025-06-06 PROCEDURE — 81001 URINALYSIS AUTO W/SCOPE: CPT

## 2025-06-06 PROCEDURE — 80053 COMPREHEN METABOLIC PANEL: CPT

## 2025-06-06 PROCEDURE — 83036 HEMOGLOBIN GLYCOSYLATED A1C: CPT

## 2025-06-06 PROCEDURE — 36415 COLL VENOUS BLD VENIPUNCTURE: CPT

## 2025-06-06 PROCEDURE — 99214 OFFICE O/P EST MOD 30 MIN: CPT | Performed by: STUDENT IN AN ORGANIZED HEALTH CARE EDUCATION/TRAINING PROGRAM

## 2025-06-06 PROCEDURE — 3078F DIAST BP <80 MM HG: CPT | Performed by: STUDENT IN AN ORGANIZED HEALTH CARE EDUCATION/TRAINING PROGRAM

## 2025-06-06 PROCEDURE — 3074F SYST BP LT 130 MM HG: CPT | Performed by: STUDENT IN AN ORGANIZED HEALTH CARE EDUCATION/TRAINING PROGRAM

## 2025-06-06 PROCEDURE — 86140 C-REACTIVE PROTEIN: CPT

## 2025-06-06 PROCEDURE — 84443 ASSAY THYROID STIM HORMONE: CPT

## 2025-06-06 ASSESSMENT — PATIENT HEALTH QUESTIONNAIRE - PHQ9: CLINICAL INTERPRETATION OF PHQ2 SCORE: 0
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
## 2025-06-09 ENCOUNTER — RESULTS FOLLOW-UP (OUTPATIENT)
Dept: MEDICAL GROUP | Facility: CLINIC | Age: 65
End: 2025-06-09
Payer: MEDICAID

## 2025-06-11 ENCOUNTER — TELEPHONE (OUTPATIENT)
Dept: MEDICAL GROUP | Facility: CLINIC | Age: 65
End: 2025-06-11
Payer: MEDICAID

## 2025-06-11 NOTE — TELEPHONE ENCOUNTER
Phone Number Called: [phone]    Call outcome: Left the patient a voicemail     Message: IM calling from [facility] on behalf of Dr. Dunlap. He would like you to call and schedule a follow up appointment with any provider to discuss your lab results. If you have any questions, comments or concerned please call back at [phone] thank you.

## 2025-08-18 ENCOUNTER — OFFICE VISIT (OUTPATIENT)
Dept: MEDICAL GROUP | Facility: CLINIC | Age: 65
End: 2025-08-18
Payer: MEDICAID

## 2025-08-18 VITALS
HEART RATE: 90 BPM | SYSTOLIC BLOOD PRESSURE: 113 MMHG | BODY MASS INDEX: 27.98 KG/M2 | HEIGHT: 68 IN | WEIGHT: 184.6 LBS | TEMPERATURE: 97.2 F | OXYGEN SATURATION: 97 % | DIASTOLIC BLOOD PRESSURE: 82 MMHG

## 2025-08-18 DIAGNOSIS — N64.52 NIPPLE DISCHARGE: ICD-10-CM

## 2025-08-18 DIAGNOSIS — M79.605 PAIN OF LEFT ANTERIOR LOWER EXTREMITY: ICD-10-CM

## 2025-08-18 DIAGNOSIS — N63.0 LUMP IN FEMALE BREAST: Primary | ICD-10-CM

## 2025-08-18 DIAGNOSIS — I25.10 CORONARY ARTERY DISEASE INVOLVING NATIVE HEART WITHOUT ANGINA PECTORIS, UNSPECIFIED VESSEL OR LESION TYPE: ICD-10-CM

## 2025-08-18 DIAGNOSIS — R07.89 OTHER CHEST PAIN: ICD-10-CM

## 2025-08-18 DIAGNOSIS — Z23 NEED FOR VACCINATION: ICD-10-CM

## 2025-08-18 PROCEDURE — 99214 OFFICE O/P EST MOD 30 MIN: CPT | Mod: 25,GC

## 2025-08-18 PROCEDURE — 3079F DIAST BP 80-89 MM HG: CPT | Mod: GC

## 2025-08-18 PROCEDURE — 3074F SYST BP LT 130 MM HG: CPT | Mod: GC

## 2025-08-18 PROCEDURE — 90677 PCV20 VACCINE IM: CPT | Performed by: FAMILY MEDICINE

## 2025-08-18 PROCEDURE — 90471 IMMUNIZATION ADMIN: CPT | Performed by: FAMILY MEDICINE

## 2025-08-18 PROCEDURE — 93000 ELECTROCARDIOGRAM COMPLETE: CPT | Mod: GC

## 2025-08-18 ASSESSMENT — FIBROSIS 4 INDEX: FIB4 SCORE: 1.19

## 2025-08-25 ENCOUNTER — APPOINTMENT (OUTPATIENT)
Dept: CARDIOLOGY | Facility: MEDICAL CENTER | Age: 65
End: 2025-08-25
Payer: MEDICAID